# Patient Record
Sex: MALE | Race: WHITE | Employment: FULL TIME | URBAN - NONMETROPOLITAN AREA
[De-identification: names, ages, dates, MRNs, and addresses within clinical notes are randomized per-mention and may not be internally consistent; named-entity substitution may affect disease eponyms.]

---

## 2017-10-31 ENCOUNTER — APPOINTMENT (OUTPATIENT)
Dept: OCCUPATIONAL MEDICINE | Facility: OTHER | Age: 37
End: 2017-10-31

## 2017-10-31 PROCEDURE — 99000 SPECIMEN HANDLING OFFICE-LAB: CPT

## 2018-01-25 ENCOUNTER — DOCUMENTATION ONLY (OUTPATIENT)
Dept: FAMILY MEDICINE | Facility: OTHER | Age: 38
End: 2018-01-25

## 2018-04-13 ENCOUNTER — OFFICE VISIT (OUTPATIENT)
Dept: FAMILY MEDICINE | Facility: OTHER | Age: 38
End: 2018-04-13
Attending: FAMILY MEDICINE

## 2018-04-13 VITALS
BODY MASS INDEX: 32.6 KG/M2 | HEART RATE: 68 BPM | DIASTOLIC BLOOD PRESSURE: 78 MMHG | TEMPERATURE: 97.9 F | WEIGHT: 224 LBS | SYSTOLIC BLOOD PRESSURE: 120 MMHG

## 2018-04-13 DIAGNOSIS — R35.0 URINARY FREQUENCY: Primary | ICD-10-CM

## 2018-04-13 LAB
ALBUMIN UR-MCNC: NEGATIVE MG/DL
APPEARANCE UR: CLEAR
BILIRUB UR QL STRIP: NEGATIVE
COLOR UR AUTO: YELLOW
GLUCOSE UR STRIP-MCNC: NEGATIVE MG/DL
HGB UR QL STRIP: NEGATIVE
KETONES UR STRIP-MCNC: NEGATIVE MG/DL
LEUKOCYTE ESTERASE UR QL STRIP: NEGATIVE
NITRATE UR QL: NEGATIVE
PH UR STRIP: 5.5 PH (ref 5–7)
SOURCE: NORMAL
SP GR UR STRIP: >1.03 (ref 1–1.03)
UROBILINOGEN UR STRIP-ACNC: 0.2 EU/DL (ref 0.2–1)

## 2018-04-13 PROCEDURE — 99213 OFFICE O/P EST LOW 20 MIN: CPT | Performed by: FAMILY MEDICINE

## 2018-04-13 PROCEDURE — 81003 URINALYSIS AUTO W/O SCOPE: CPT | Performed by: FAMILY MEDICINE

## 2018-04-13 NOTE — NURSING NOTE
Patient here for bladder pressure for the past month. Denies burning or discharge does have urgency. Michelle Cerda LPN .......................4/13/2018  9:41 AM

## 2018-04-13 NOTE — MR AVS SNAPSHOT
"              After Visit Summary   2018    Alexx Snell    MRN: [id number]           Patient Information     Date Of Birth          1980        Visit Information        Provider Department      2018 9:30 AM Alexx Viramontes MD [facility]        Today's Diagnoses     Urinary frequency    -  1       Follow-ups after your visit        Who to contact     If you have questions or need follow up information about today's clinic visit or your schedule please contact [facility] directly at [phone].  Normal or non-critical lab and imaging results will be communicated to you by [employer], letter or phone within 4 business days after the clinic has received the results. If you do not hear from us within 7 days, please contact the clinic through [employer] or phone. If you have a critical or abnormal lab result, we will notify you by phone as soon as possible.  Submit refill requests through [employer] or call your pharmacy and they will forward the refill request to us. Please allow 3 business days for your refill to be completed.          Additional Information About Your Visit        MyChart Information     [employer] lets you send messages to your doctor, view your test results, renew your prescriptions, schedule appointments and more. To sign up, go to [email or web address] . Click on \"Log in\" on the left side of the screen, which will take you to the Welcome page. Then click on \"Sign up Now\" on the right side of the page.     You will be asked to enter the access code listed below, as well as some personal information. Please follow the directions to create your username and password.     Your access code is: [email or web address]  Expires: 7/15/2018 10:56 AM     Your access code will  in 90 days. If you need help or a new code, please call your [facility] or [phone].        Care EveryWhere ID     This is your Care EveryWhere ID. This could be used by " other organizations to access your [facility] medical records  [id number]        Your Vitals Were     Pulse Temperature BMI (Body Mass Index)             68 97.9  F (36.6  C) 32.6 kg/m2          Blood Pressure from Last 3 Encounters:   04/13/18 120/78   03/09/15 124/82   07/24/13 114/76    Weight from Last 3 Encounters:   04/13/18 224 lb (101.6 kg)   03/09/15 210 lb 12.8 oz (95.6 kg)   07/24/13 210 lb (95.3 kg)              We Performed the Following     UA reflex to Microscopic          Today's Medication Changes          These changes are accurate as of 4/13/18 11:59 PM.  If you have any questions, ask your nurse or doctor.               Stop taking these medicines if you haven't already. Please contact your care team if you have questions.     nortriptyline 10 MG capsule   Commonly known as:  PAMELOR   Stopped by:  Alexx Viramontes MD           traMADol 50 MG tablet   Commonly known as:  ULTRAM   Stopped by:  Alexx Viramontes MD                    Primary Care Provider Office Phone # Fax #    Johnathon Milner [phone]       [facility] [address]        Equal Access to Services     [facility] AH: Hadii queta Valentine, waaxda lububbaadaha, qaybta kaalmada colletteda, alfredo silva. So [facility] [phone].    ATENCIÓN: Si habla español, tiene a jon disposición servicios gratuitos de asistencia lingüística. [facility] [phone].    We comply with applicable federal civil rights laws and [place] laws. We do not discriminate on the basis of race, color, national origin, age, disability, sex, sexual orientation, or gender identity.            Thank you!     Thank you for choosing [facility] AND [facility]  for your care. Our goal is always to provide you with excellent care. Hearing back from our patients is one way we can continue to improve our services. Please take a few minutes to complete the written survey that you may receive in the mail  after your visit with us. Thank you!             Your Updated Medication List - Protect others around you: Learn how to safely use, store and throw away your medicines at www.disposemymeds.org.      Notice  As of 4/13/2018 11:59 PM    You have not been prescribed any medications.

## 2018-04-16 NOTE — PROGRESS NOTES
SUBJECTIVE:   Alexx Snell is a 37 year old male who presents to clinic today for the following health issues: Possible UTI    HPI Comments: Patient arrives here for possible UTI.  He reports symptoms been going on for 1 month.  Sexually active but reports no chance of having an STD.  He feels pressure to the right of his suprapubic.  This been going on for 1 month.    UTI           Patient Active Problem List    Diagnosis Date Noted     Urinary frequency 04/13/2018     Priority: Medium     DDD (degenerative disc disease), lumbar 07/08/2013     Priority: Medium     No past medical history on file.   No past surgical history on file.  No current outpatient prescriptions on file.     No Known Allergies    Review of Systems     OBJECTIVE:     /78 (BP Location: Right arm, Patient Position: Sitting)  Pulse 68  Temp 97.9  F (36.6  C)  Wt 224 lb (101.6 kg)  BMI 32.6 kg/m2  Body mass index is 32.6 kg/(m^2).  Physical Exam   Constitutional: He appears well-developed and well-nourished.   Pulmonary/Chest: Effort normal and breath sounds normal.   Abdominal: Soft. He exhibits no distension. There is no tenderness. There is no rebound and no guarding.   Genitourinary: Penis normal.   Genitourinary Comments: No discharge       Diagnostic Test Results:  No results found for this or any previous visit (from the past 24 hour(s)).    ASSESSMENT/PLAN:         1. Urinary frequency  Bladder scan was performed showing approximately 25 cc postvoid residual.  Discussed with patient likely bladder irritation.  We discussed medication.  Patient would like to observe it for now which I am in agreement.  If symptoms do not respond or slowly improve consider urology referral.  - UA reflex to Microscopic      Alexx Viramontes MD  [facility] AND [facility]